# Patient Record
(demographics unavailable — no encounter records)

---

## 2024-11-07 NOTE — ASSESSMENT
[FreeTextEntry1] : 1. AR - Fluticasone nasal, levocetirizine 2.5ml  2. RAD - montelukast 4mg, budesonide + albuterol prn  3. AD - continue topical tx per pcp

## 2024-11-07 NOTE — HISTORY OF PRESENT ILLNESS
[de-identified] : AMANDA ESPINAL is a 4 year yo male who is here today for history of asthma and eczema mom also states me might suffer from allergies because he gets sick often and dad has a history of allergies as well he takes levocetirizine every night once a day mom also states he had 2 asthma attacks in the past where he went to the emergency room but was prescribed on the past budesonide by the pediatrician  so mom give him budesonide as needed he had a really bad flare up of eczema he uses eucrisa, mom states dad as Psoriasis and eczema mom also states he had adenoids removed and tubes but in his ear mom also has history psoriasis and eczema mom states when he was young he had milk allergies but outgrew it

## 2024-11-07 NOTE — REVIEW OF SYSTEMS
[Rhinorrhea] : rhinorrhea [Nasal Congestion] : nasal congestion [Post Nasal Drip] : post nasal drip [Sneezing] : sneezing [Difficulty Breathing] : dyspnea [Urticaria] : urticaria [Swelling] : swelling [Nl] : Genitourinary

## 2025-01-02 NOTE — HISTORY OF PRESENT ILLNESS
[de-identified] : AMANDA ESPINAL is a 4 year yo male who is here today for history of asthma and eczema mom also states me might suffer from allergies because he gets sick often and dad has a history of allergies as well he takes levocetirizine every night once a day mom also states he had 2 asthma attacks in the past where he went to the emergency room but was prescribed on the past budesonide by the pediatrician so mom give him budesonide as needed he had a really bad flare up of eczema he uses eucrisa, mom states dad as Psoriasis and eczema mom also states he had adenoids removed and tubes but in his ear mom also has history psoriasis and eczema mom states when he was young he had milk allergies but outgrew it.   He is here today for a follow up, mom says he did not get blood work done, she wants to wait after he turns five years old, she has been giving all medication daily she noticed improvement in all symptoms, but he got sick three weeks ago, mom added Albuterol treatment, but she noticed his symptoms were still under control with all medication. Mom says he is still experiencing redness and dryness on his back, stomach, behind knees, toes and shoulders. She wants to know what his next steps are.